# Patient Record
(demographics unavailable — no encounter records)

---

## 2024-11-07 NOTE — PLAN
[FreeTextEntry1] : I recommended that she continue to use twice weekly preservative-free clobetasol ointment and twice weekly vulvar estradiol cream and return in 1 year for a repeat evaluation.

## 2024-11-07 NOTE — PHYSICAL EXAM
[Chaperone Present] : A chaperone was present in the examining room during all aspects of the physical examination [TextEntry] : ***General*** General Appearance: normal; Judgment and insight: normal; the patient is oriented to time, place and person; Recent and remote memory: normal; Mood and affect: normal; Respiratory effort: normal.  ***Pelvic Examination*** External genitalia: the appearance and hair distribution are normal; no lesions are appreciated. Urethra/urethral meatus: no masses or tenderness are appreciated; there is no scarring noted. Bladder: nontender; there is no fullness appreciated; no masses were palpated. Vagina: the vagina is atrophic; a white (estradiol) discharge is seen; no lesions are seen; pelvic support is adequate without any evidence of cystocele or rectocele. Cervix: normal in appearance; no overt lesions are seen. Uterus: Anteverted; normal in size, mobile, nontender, and well supported. Adnexa/parametria: nontender and not enlarged; no masses are palpated. A stool specimen was not indicated at this time.  ***colposcopy of the vulva*** Colposcopy of the external genitalia showed that the labia majora and labia minora were normal. She identified the right labium majus as the location of her pain/itching/irritation. There was no vestibular tenderness of the anterior minor vestibular glands, and no vestibular tenderness of the posterior minor vestibular glands. There was no white epithelium seen over the clitoral davidson or extending to the interlabial sulci. There were no herpetic ulcerations seen. There was no evidence of other dermatopathology. There was no erythema of the introitus, perineum or perianal area but there was erythema was seen on the labia majora suggestive of steroid dermatitis. There was no vulvar atrophy at the introitus. The introitus did not admit 2 examining fingers.

## 2024-11-07 NOTE — ASSESSMENT
[TextEntry] : The patient's lichen sclerosus is well-controlled with either triamcinolone 0.5% ointment 3 times weekly or preservative-free clobetasol 0.05% ointment twice weekly.  She continues to have some vulvar discomfort but was unable to tolerate gabapentin.  She continues to use twice weekly vulvar estradiol cream. 23 minutes of total time was spent on the date of the encounter. This included time for review of medical records and laboratory results, face to face time (including the physical examination counseling and coordination of care), time for patient education, treatment plans, answering questions, communicating with other doctors and for medical record documentation.  The time spent is separate from time spent on separately billed procedures.